# Patient Record
Sex: MALE | Race: WHITE | NOT HISPANIC OR LATINO | Employment: OTHER | URBAN - METROPOLITAN AREA
[De-identification: names, ages, dates, MRNs, and addresses within clinical notes are randomized per-mention and may not be internally consistent; named-entity substitution may affect disease eponyms.]

---

## 2018-11-22 ENCOUNTER — HOSPITAL ENCOUNTER (INPATIENT)
Facility: HOSPITAL | Age: 38
LOS: 1 days | Discharge: HOME | DRG: 638 | End: 2018-11-23
Attending: EMERGENCY MEDICINE | Admitting: INTERNAL MEDICINE
Payer: MEDICARE

## 2018-11-22 DIAGNOSIS — E11.10 DKA (DIABETIC KETOACIDOSES): Primary | ICD-10-CM

## 2018-11-22 DIAGNOSIS — N39.0 UTI (URINARY TRACT INFECTION): ICD-10-CM

## 2018-11-22 PROBLEM — E16.2 HYPOGLYCEMIA: Status: ACTIVE | Noted: 2018-11-22

## 2018-11-22 LAB
ACETONE SERPL-MCNC: ABNORMAL MG/DL
ALBUMIN SERPL BCP-MCNC: 4.1 G/DL (ref 3–5.2)
ALP SERPL-CCNC: 226 U/L (ref 43–122)
ALT SERPL W P-5'-P-CCNC: 38 U/L (ref 9–52)
AMPHETAMINES SERPL QL SCN: NEGATIVE
ANION GAP SERPL CALCULATED.3IONS-SCNC: 19 MMOL/L (ref 5–14)
ANION GAP SERPL CALCULATED.3IONS-SCNC: 28 MMOL/L (ref 5–14)
ANION GAP SERPL CALCULATED.3IONS-SCNC: 29 MMOL/L (ref 5–14)
AST SERPL W P-5'-P-CCNC: 36 U/L (ref 17–59)
BACTERIA UR QL AUTO: ABNORMAL /HPF
BARBITURATES UR QL: NEGATIVE
BASE EX.OXY STD BLDV CALC-SCNC: 60.9 %
BASE EXCESS BLDV CALC-SCNC: -18.5 MMOL/L (ref -2.1–2.1)
BASOPHILS # BLD AUTO: 0.1 THOUSANDS/ΜL (ref 0–0.1)
BASOPHILS NFR BLD AUTO: 1 % (ref 0–1)
BENZODIAZ UR QL: NEGATIVE
BILIRUB SERPL-MCNC: 0.6 MG/DL
BILIRUB UR QL STRIP: NEGATIVE
BUN SERPL-MCNC: 29 MG/DL (ref 5–25)
BUN SERPL-MCNC: 32 MG/DL (ref 5–25)
BUN SERPL-MCNC: 32 MG/DL (ref 5–25)
CALCIUM SERPL-MCNC: 8.7 MG/DL (ref 8.4–10.2)
CALCIUM SERPL-MCNC: 9.4 MG/DL (ref 8.4–10.2)
CALCIUM SERPL-MCNC: 9.6 MG/DL (ref 8.4–10.2)
CHLORIDE SERPL-SCNC: 103 MMOL/L (ref 97–108)
CHLORIDE SERPL-SCNC: 92 MMOL/L (ref 97–108)
CHLORIDE SERPL-SCNC: 92 MMOL/L (ref 97–108)
CLARITY UR: ABNORMAL
CO2 SERPL-SCNC: 14 MMOL/L (ref 22–30)
CO2 SERPL-SCNC: 8 MMOL/L (ref 22–30)
CO2 SERPL-SCNC: 9 MMOL/L (ref 22–30)
COCAINE UR QL: NEGATIVE
COLOR UR: ABNORMAL
CREAT SERPL-MCNC: 0.78 MG/DL (ref 0.7–1.5)
CREAT SERPL-MCNC: 0.89 MG/DL (ref 0.7–1.5)
CREAT SERPL-MCNC: 0.91 MG/DL (ref 0.7–1.5)
EOSINOPHIL # BLD AUTO: 0.1 THOUSAND/ΜL (ref 0–0.4)
EOSINOPHIL NFR BLD AUTO: 1 % (ref 0–6)
ERYTHROCYTE [DISTWIDTH] IN BLOOD BY AUTOMATED COUNT: 16.3 %
GFR SERPL CREATININE-BSD FRML MDRD: 108 ML/MIN/1.73SQ M
GFR SERPL CREATININE-BSD FRML MDRD: 110 ML/MIN/1.73SQ M
GFR SERPL CREATININE-BSD FRML MDRD: 116 ML/MIN/1.73SQ M
GLUCOSE SERPL-MCNC: 311 MG/DL (ref 70–99)
GLUCOSE SERPL-MCNC: 325 MG/DL (ref 70–99)
GLUCOSE SERPL-MCNC: 362 MG/DL (ref 70–99)
GLUCOSE SERPL-MCNC: 380 MG/DL (ref 70–99)
GLUCOSE SERPL-MCNC: 390 MG/DL (ref 70–99)
GLUCOSE SERPL-MCNC: 508 MG/DL (ref 70–99)
GLUCOSE SERPL-MCNC: 742 MG/DL (ref 70–99)
GLUCOSE SERPL-MCNC: 748 MG/DL (ref 70–99)
GLUCOSE SERPL-MCNC: >600 MG/DL (ref 70–99)
GLUCOSE SERPL-MCNC: >600 MG/DL (ref 70–99)
GLUCOSE UR STRIP-MCNC: ABNORMAL MG/DL
HCO3 BLDV-SCNC: 9.3 MMOL/L (ref 23–28)
HCT VFR BLD AUTO: 38.6 % (ref 41–53)
HGB BLD-MCNC: 12 G/DL (ref 13.5–17.5)
HGB UR QL STRIP.AUTO: 150
KETONES UR STRIP-MCNC: ABNORMAL MG/DL
LACTATE SERPL-SCNC: 2.1 MMOL/L (ref 0.7–2)
LACTATE SERPL-SCNC: 2.1 MMOL/L (ref 0.7–2)
LEUKOCYTE ESTERASE UR QL STRIP: 500
LYMPHOCYTES # BLD AUTO: 2.3 THOUSANDS/ΜL (ref 0.5–4)
LYMPHOCYTES NFR BLD AUTO: 20 % (ref 20–50)
MAGNESIUM SERPL-MCNC: 2.4 MG/DL (ref 1.6–2.3)
MCH RBC QN AUTO: 27.7 PG (ref 26–34)
MCHC RBC AUTO-ENTMCNC: 31.2 G/DL (ref 31–36)
MCV RBC AUTO: 89 FL (ref 80–100)
METHADONE UR QL: NEGATIVE
MONOCYTES # BLD AUTO: 0.5 THOUSAND/ΜL (ref 0.2–0.9)
MONOCYTES NFR BLD AUTO: 4 % (ref 1–10)
NEUTROPHILS # BLD AUTO: 8.2 THOUSANDS/ΜL (ref 1.8–7.8)
NEUTS SEG NFR BLD AUTO: 73 % (ref 45–65)
NITRITE UR QL STRIP: NEGATIVE
NON-SQ EPI CELLS URNS QL MICRO: ABNORMAL /HPF
O2 CT BLDV-SCNC: 10.9 ML/DL
OPIATES UR QL SCN: NEGATIVE
PCO2 BLDV: 28 MM HG (ref 41–51)
PCP UR QL: NEGATIVE
PH BLDV: 7.13 [PH] (ref 7.35–7.45)
PH UR STRIP.AUTO: 5 [PH] (ref 4.5–8)
PLATELET # BLD AUTO: 578 THOUSANDS/UL (ref 150–450)
PMV BLD AUTO: 7.7 FL (ref 8.9–12.7)
PO2 BLDV: 35 MM HG
POTASSIUM SERPL-SCNC: 5 MMOL/L (ref 3.6–5)
POTASSIUM SERPL-SCNC: 5.6 MMOL/L (ref 3.6–5)
POTASSIUM SERPL-SCNC: 5.7 MMOL/L (ref 3.6–5)
POTASSIUM SERPL-SCNC: 5.7 MMOL/L (ref 3.6–5)
PROT SERPL-MCNC: 7.9 G/DL (ref 5.9–8.4)
PROT UR STRIP-MCNC: ABNORMAL MG/DL
RBC # BLD AUTO: 4.35 MILLION/UL (ref 4.5–5.9)
RBC #/AREA URNS AUTO: ABNORMAL /HPF
SODIUM SERPL-SCNC: 129 MMOL/L (ref 137–147)
SODIUM SERPL-SCNC: 129 MMOL/L (ref 137–147)
SODIUM SERPL-SCNC: 136 MMOL/L (ref 137–147)
SP GR UR STRIP.AUTO: 1.01 (ref 1–1.04)
THC UR QL: NEGATIVE
UROBILINOGEN UA: NEGATIVE MG/DL
WBC # BLD AUTO: 11.2 THOUSAND/UL (ref 4.5–11)
WBC #/AREA URNS AUTO: ABNORMAL /HPF

## 2018-11-22 PROCEDURE — 82009 KETONE BODYS QUAL: CPT | Performed by: EMERGENCY MEDICINE

## 2018-11-22 PROCEDURE — 36415 COLL VENOUS BLD VENIPUNCTURE: CPT | Performed by: EMERGENCY MEDICINE

## 2018-11-22 PROCEDURE — 87086 URINE CULTURE/COLONY COUNT: CPT | Performed by: EMERGENCY MEDICINE

## 2018-11-22 PROCEDURE — 87147 CULTURE TYPE IMMUNOLOGIC: CPT | Performed by: EMERGENCY MEDICINE

## 2018-11-22 PROCEDURE — 99223 1ST HOSP IP/OBS HIGH 75: CPT | Performed by: INTERNAL MEDICINE

## 2018-11-22 PROCEDURE — 85025 COMPLETE CBC W/AUTO DIFF WBC: CPT | Performed by: EMERGENCY MEDICINE

## 2018-11-22 PROCEDURE — 82805 BLOOD GASES W/O2 SATURATION: CPT | Performed by: EMERGENCY MEDICINE

## 2018-11-22 PROCEDURE — 81001 URINALYSIS AUTO W/SCOPE: CPT | Performed by: EMERGENCY MEDICINE

## 2018-11-22 PROCEDURE — 83605 ASSAY OF LACTIC ACID: CPT | Performed by: EMERGENCY MEDICINE

## 2018-11-22 PROCEDURE — 96361 HYDRATE IV INFUSION ADD-ON: CPT

## 2018-11-22 PROCEDURE — 93005 ELECTROCARDIOGRAM TRACING: CPT

## 2018-11-22 PROCEDURE — 87186 SC STD MICRODIL/AGAR DIL: CPT | Performed by: EMERGENCY MEDICINE

## 2018-11-22 PROCEDURE — 87040 BLOOD CULTURE FOR BACTERIA: CPT | Performed by: EMERGENCY MEDICINE

## 2018-11-22 PROCEDURE — 96374 THER/PROPH/DIAG INJ IV PUSH: CPT

## 2018-11-22 PROCEDURE — 82948 REAGENT STRIP/BLOOD GLUCOSE: CPT

## 2018-11-22 PROCEDURE — 80048 BASIC METABOLIC PNL TOTAL CA: CPT | Performed by: EMERGENCY MEDICINE

## 2018-11-22 PROCEDURE — 84132 ASSAY OF SERUM POTASSIUM: CPT | Performed by: EMERGENCY MEDICINE

## 2018-11-22 PROCEDURE — 80053 COMPREHEN METABOLIC PANEL: CPT | Performed by: EMERGENCY MEDICINE

## 2018-11-22 PROCEDURE — 83735 ASSAY OF MAGNESIUM: CPT | Performed by: EMERGENCY MEDICINE

## 2018-11-22 PROCEDURE — 99285 EMERGENCY DEPT VISIT HI MDM: CPT

## 2018-11-22 PROCEDURE — 80307 DRUG TEST PRSMV CHEM ANLYZR: CPT | Performed by: EMERGENCY MEDICINE

## 2018-11-22 RX ORDER — DEXTROSE AND SODIUM CHLORIDE 5; .9 G/100ML; G/100ML
250 INJECTION, SOLUTION INTRAVENOUS CONTINUOUS
Status: DISCONTINUED | OUTPATIENT
Start: 2018-11-22 | End: 2018-11-23

## 2018-11-22 RX ORDER — NICOTINE 21 MG/24HR
1 PATCH, TRANSDERMAL 24 HOURS TRANSDERMAL DAILY
Status: DISCONTINUED | OUTPATIENT
Start: 2018-11-23 | End: 2018-11-23 | Stop reason: HOSPADM

## 2018-11-22 RX ORDER — ACETAMINOPHEN 325 MG/1
650 TABLET ORAL EVERY 6 HOURS PRN
Status: DISCONTINUED | OUTPATIENT
Start: 2018-11-22 | End: 2018-11-23 | Stop reason: HOSPADM

## 2018-11-22 RX ORDER — SODIUM CHLORIDE 9 MG/ML
1000 INJECTION, SOLUTION INTRAVENOUS ONCE
Status: COMPLETED | OUTPATIENT
Start: 2018-11-22 | End: 2018-11-22

## 2018-11-22 RX ORDER — LEVOFLOXACIN 5 MG/ML
750 INJECTION, SOLUTION INTRAVENOUS ONCE
Status: COMPLETED | OUTPATIENT
Start: 2018-11-22 | End: 2018-11-22

## 2018-11-22 RX ORDER — CIPROFLOXACIN 250 MG/1
500 TABLET, FILM COATED ORAL EVERY 12 HOURS SCHEDULED
Status: DISCONTINUED | OUTPATIENT
Start: 2018-11-23 | End: 2018-11-23 | Stop reason: HOSPADM

## 2018-11-22 RX ORDER — SODIUM CHLORIDE 9 MG/ML
500 INJECTION, SOLUTION INTRAVENOUS CONTINUOUS
Status: DISPENSED | OUTPATIENT
Start: 2018-11-22 | End: 2018-11-22

## 2018-11-22 RX ORDER — CIPROFLOXACIN 2 MG/ML
400 INJECTION, SOLUTION INTRAVENOUS EVERY 12 HOURS
Status: DISCONTINUED | OUTPATIENT
Start: 2018-11-23 | End: 2018-11-22

## 2018-11-22 RX ORDER — SODIUM CHLORIDE 9 MG/ML
500 INJECTION, SOLUTION INTRAVENOUS CONTINUOUS
Status: DISCONTINUED | OUTPATIENT
Start: 2018-11-22 | End: 2018-11-22

## 2018-11-22 RX ORDER — SODIUM CHLORIDE 9 MG/ML
2000 INJECTION, SOLUTION INTRAVENOUS CONTINUOUS
Status: DISPENSED | OUTPATIENT
Start: 2018-11-22 | End: 2018-11-22

## 2018-11-22 RX ORDER — 0.9 % SODIUM CHLORIDE 0.9 %
3 VIAL (ML) INJECTION AS NEEDED
Status: DISCONTINUED | OUTPATIENT
Start: 2018-11-22 | End: 2018-11-23 | Stop reason: HOSPADM

## 2018-11-22 RX ORDER — SODIUM CHLORIDE 9 MG/ML
250 INJECTION, SOLUTION INTRAVENOUS CONTINUOUS
Status: DISPENSED | OUTPATIENT
Start: 2018-11-22 | End: 2018-11-23

## 2018-11-22 RX ORDER — SODIUM CHLORIDE 9 MG/ML
250 INJECTION, SOLUTION INTRAVENOUS CONTINUOUS
Status: DISCONTINUED | OUTPATIENT
Start: 2018-11-22 | End: 2018-11-22

## 2018-11-22 RX ADMIN — Medication 6 UNITS/HR: at 17:20

## 2018-11-22 RX ADMIN — SODIUM CHLORIDE 1000 ML/HR: 9 INJECTION, SOLUTION INTRAVENOUS at 17:13

## 2018-11-22 RX ADMIN — SODIUM CHLORIDE 250 ML/HR: 9 INJECTION, SOLUTION INTRAVENOUS at 20:23

## 2018-11-22 RX ADMIN — SODIUM CHLORIDE 250 ML/HR: 9 INJECTION, SOLUTION INTRAVENOUS at 20:21

## 2018-11-22 RX ADMIN — INSULIN HUMAN 6 UNITS: 100 INJECTION, SOLUTION PARENTERAL at 17:14

## 2018-11-22 RX ADMIN — LEVOFLOXACIN 750 MG: 750 INJECTION, SOLUTION INTRAVENOUS at 17:02

## 2018-11-22 RX ADMIN — SODIUM CHLORIDE 250 ML/HR: 9 INJECTION, SOLUTION INTRAVENOUS at 23:54

## 2018-11-22 RX ADMIN — SODIUM CHLORIDE 1000 ML: 9 INJECTION, SOLUTION INTRAVENOUS at 15:50

## 2018-11-22 RX ADMIN — SODIUM CHLORIDE 500 ML/HR: 9 INJECTION, SOLUTION INTRAVENOUS at 18:06

## 2018-11-22 NOTE — H&P
History and Physical - [age] [facility] Internal Medicine    Patient Information: Mariel Torres 41 y o  male MRN: [id number]  Unit/Bed#: ED 03 Encounter: [id number]  Admitting Physician: Keagan Coker  PCP: No primary care provider on file  Date of Admission:  11/22/18    Assessment/Plan:    Hospital Problem List:     Principal Problem:    DKA (diabetic ketoacidoses) ([address] )  Active Problems:    UTI (urinary tract infection)      Plan for the Primary Problem(s):      #1diabetic ketoacidosis  Initiate DKA protocol  IV fluids with normal saline per DKA protocol  Electrolyte replacement per DKA protocol   Admit to ICU/every 4 BMPs  Begin no concentrated carbohydrate diet  Once anion gap closed is following  Cover with Aspart SSI as needed   Will order HgbA1C to assess status of recent glycemic control  Well initiate home medications once med rec is completed and confirmed by pharmacy  #2 UTI  Levofloxacin initiated in the emergency department  We will switch to by mouth or IV Cipro tomorrow    #3 hyponatremia  Likely due to dehydration from DKA  Aggressive IV resuscitation with normal saline    #4Tobacco dependance  SMOKING history/NICOTINE ADDICTION:  20 pack year history  [facility] is a non-smoking campus and patient likely to have cravings  Will initiate therapy with transdermal Nicotine patch 14 mg transdermal daily  Extensive consultation in regards to benefits of cessation have been given, offered help the sensation  #5 hyperkalemia? This is likely due to hemolysis  We will repeat BMP again and address accordingly    VTE Prophylaxis: Enoxaparin (Lovenox)  / sequential compression device   Code Status: code  POLST: There is no POLST form on file for this patient (pre-hospital)    Anticipated Length of Stay:  Patient will be admitted on an Inpatient basis with an anticipated length of stay of  Less than2 midnights     Justification for Hospital Stay: DKA    Total Time for Visit, including Counseling / Coordination of Care: 45 minutes  Greater than 50% of this total time spent on direct patient counseling and coordination of care  Chief Complaint:   Elevated sugars ×2 days  History of Present Illness:    Preethi Meneses is a 41 y o  male who was sent here due to elevated blood glucose levels  Patient reports unfortunately due to homelessness he has not been able to take his medications as previously ordered, endorses being 2 5 days without insulin  States he is very thirsty  Feels weak  + polyuria and polydipsia  States he was recently admitted in [place], [place]  Came here for housing reasons  States he feels his glucose is high  No fevers or chills symptoms moderate in severity  No other aggravating or alleviating factors  No chest pain  No cough  No uri symptoms  No radiation of symptoms  He was found to have DKA and will be admitted for further evaluation and treatment  He has no present complaints      Review of Systems:    Review of Systems  12 point system reviewed are negative except as above  Past Medical and Surgical History:     Past Medical History:   Diagnosis Date    Diabetes mellitus ([facility] [address] )     Hepatitis C     Orthostatic hypotension        Past Surgical History:   Procedure Laterality Date    CYST REMOVAL      FRACTURE SURGERY         Meds/Allergies:    Prior to Admission medications    Not on File     I have reviewed home medications with patient personally  Allergies:    Allergies   Allergen Reactions    Ceclor [Cefaclor] Rash    Fish Oil Rash    Shellfish-Derived Products Rash       Social History:     Marital Status: Single   Occupation: unemployed  Patient Pre-hospital Living Situation: currently homeless/face [place]  Patient Pre-hospital Level of Mobility: full mobility  Patient Pre-hospital Diet Restrictions: does not follow diabetic diet  Substance Use History:   History   Alcohol Use No     History   Smoking Status    Current Some Day Smoker   Smokeless Tobacco  Not on file     History   Drug Use No     Comment: hx meth use - clean x2 weeks       Family History:    non-contributory    Physical Exam:     Vitals:   Blood Pressure: 146/73 (11/22/18 1703)  Pulse: 98 (11/22/18 1703)  Temperature: (!) 96 2 °F (35 7 °C) (11/22/18 1522)  Temp Source: Tympanic (11/22/18 1522)  Respirations: (!) 24 (11/22/18 1703)  Weight - Scale: 63 5 kg (140 lb) (11/22/18 1522)  SpO2: 100 % (11/22/18 1703)    Physical Exam  The patient appeared well nourished and normally developed  Vital signs as documented  Head exam is unremarkable  No scleral icterus or corneal arcus noted  Neck is without jugular venous distension, thyromegaly, or carotid bruits  Carotid upstrokes are brisk bilaterally  Lungs are clear to auscultation and percussion  Cardiac exam reveals the PMI to be normally sized and situated  Rhythm is regular  First and second heart sounds normal  No murmurs, rubs or gallops  Abdominal exam reveals normal bowel sounds, no masses, no organomegaly and no aortic enlargement  Extremities are nonedematous and both femoral and pedal pulses are normal   Additional Data:     Lab Results: I have personally reviewed pertinent reports  Results from last 7 days  Lab Units 11/22/18  1546   WBC Thousand/uL 11 20*   HEMOGLOBIN g/dL 12 0*   HEMATOCRIT % 38 6*   PLATELETS Thousands/uL 578*   NEUTROS PCT % 73*   LYMPHS PCT % 20   MONOS PCT % 4   EOS PCT % 1       Results from last 7 days  Lab Units 11/22/18  1546   POTASSIUM mmol/L 5 7*   CHLORIDE mmol/L 92*   CO2 mmol/L 8*   BUN mg/dL 32*   CREATININE mg/dL 0 89   CALCIUM mg/dL 9 6   ALK PHOS U/L 226*   ALT U/L 38   AST U/L 36           Imaging: I have personally reviewed pertinent reports  No results found  EKG, Pathology, and Other Studies Reviewed on Admission:   · EKG: noncontributory    Allscripts / Epic Records Reviewed: No     ** Please Note: This note has been constructed using a voice recognition system   **

## 2018-11-22 NOTE — SEPSIS NOTE
Sepsis Note   Lopez Steinberg 41 y o  male MRN: [id number]  Unit/Bed#: ED 03 Encounter: [id number]            Initial Sepsis Screening     Row Name 11/22/18 1708 11/22/18 1636             Is the patient's history suggestive of a new or worsening infection? (!)  Yes (Proceed)  -RP (!)  Yes (Proceed)  -RP       Suspected source of infection urinary tract infection  -RP urinary tract infection  -RP       Are two or more of the following signs & symptoms of infection both present and new to the patient?  (!)  Yes (Proceed)  -RP       Indicate SIRS criteria Tachycardia > 90 bpm;Tachypnea > 20 resp per min  -RP Tachycardia > 90 bpm;Tachypnea > 20 resp per min  -RP       If the answer is yes to both questions, suspicion of sepsis is present  [de-identified]         If severe sepsis is present AND tissue hypoperfusion perists in the hour after fluid resuscitation or lactate > 4, the patient meets criteria for SEPTIC SHOCK           Are any of the following organ dysfunction criteria present within 6 hours of suspected infection and SIRS criteria that are NOT considered to be chronic conditions?  (!)  Yes  -RP       Organ dysfunction Lactate > 2 0 mmol/L  -RP         Date of presentation of severe sepsis 11/22/18  -RP         Time of presentation of severe sepsis 1709  -RP         Tissue hypoperfusion persists in the hour after crystalloid fluid administration, evidenced, by either:           Was hypotension present within one hour of the conclusion of crystalloid fluid administration?  No  -RP         Date of presentation of septic shock           Time of presentation of septic shock             User Key  (r) = Recorded By, (t) = Taken By, (c) = Cosigned By    [address] Name Provider Type    RP Rashard Kelly MD Physician               [facility] (last 720 hours)      Sepsis Reassess     [address] Name 11/22/18 1740                   Repeat Volume Status and Tissue Perfusion Assessment Performed    Repeat Volume Status and Tissue Perfusion Assessment Performed Yes  -RP           Volume Status and Tissue Perfusion Post Fluid Resuscitation- Must Document 5 of the Following 7:    Vital Signs Reviewed (HR, RR, BP, T) Yes  -RP        Arterial Oxygen Saturation Reviewed (POx, SaO2 or SpO2) Yes (comment %)  -RP        Cardio (!)  Normal S1/S2; Tachycardia;Regular rate and rhythm  -RP        Pulmonary Normal effort;Clear to auscultation  -RP        Capillary Refill Brisk  -RP        Peripheral Pulses Radial  -RP        Peripheral Pulse +2  -RP        Skin Warm  -RP        Urine output assessed Adequate  -RP           *OR*   Intensive Monitoring- Must Document One of the Following Four *:    Vital Signs Reviewed          * Central Venous Pressure (CVP or RAP)          * Central Venous Oxygen (SVO2, ScvO2 or Oxygen saturation via central catheter)          * Bedside Cardiovascular US in IVC diameter and % collapse          * Passive Leg Raise OR Crystalloid Challenge            User Key  (r) = Recorded By, (t) = Taken By, (c) = Cosigned By    Initials Name Provider Type    RP Leila Ellington MD Physician

## 2018-11-22 NOTE — ED PROVIDER NOTES
History  Chief Complaint   Patient presents with    Hyperglycemia - Symptomatic     Patient has not had insulin for 2 days, accucheck for EMS reading HI     This is a 40 y/o male that presents to the ED after 2 5 days without insulin  States he is very thirsty  Feels weak  + polyuria and polydipsia  States he was recently admitted in [place], [facility]  Came here for housing reasons  States he feels his glucose is high  No fevers or chills symptoms moderate in severity  No other aggravating or alleviating factors  No chest pain  No cough  No uri symptoms  No radiation of symptoms  DDx: hyperglycemia, DKA  None       Past Medical History:   Diagnosis Date    Diabetes mellitus ([facility] [address] )     Hepatitis C     Orthostatic hypotension        Past Surgical History:   Procedure Laterality Date    CYST REMOVAL      FRACTURE SURGERY         History reviewed  No pertinent family history  I have reviewed and agree with the history as documented  Social History   Substance Use Topics    Smoking status: Current Some Day Smoker    Smokeless tobacco: Not on file    Alcohol use No        Review of Systems   Constitutional: Negative for activity change, appetite change and fever  HENT: Negative for congestion, ear pain, rhinorrhea and sore throat  Eyes: Negative for pain, discharge and redness  Respiratory: Negative for cough, shortness of breath and wheezing  Cardiovascular: Negative for chest pain and palpitations  Gastrointestinal: Negative for abdominal pain, diarrhea, nausea and vomiting  Endocrine: Positive for polydipsia and polyuria  Genitourinary: Negative for difficulty urinating, dysuria, frequency and urgency  Musculoskeletal: Negative for arthralgias and myalgias  Skin: Negative for color change and rash  Allergic/Immunologic: Negative for immunocompromised state  Neurological: Positive for weakness  Negative for dizziness, syncope and light-headedness     Hematological: Does not bruise/bleed easily  Psychiatric/Behavioral: Negative for confusion  All other systems reviewed and are negative  Physical Exam  Physical Exam   Constitutional: He is oriented to person, place, and time  He appears well-developed  No distress  HENT:   Head: Normocephalic and atraumatic  Nose: Nose normal    Eyes: Conjunctivae are normal  No scleral icterus  Neck: Normal range of motion  Neck supple  Cardiovascular: Regular rhythm, normal heart sounds and intact distal pulses  Tachycardic  Pulmonary/Chest: Effort normal and breath sounds normal  No stridor  No respiratory distress  He has no wheezes  Tachypnea  Abdominal: Soft  He exhibits no distension  There is no tenderness  There is no rebound and no guarding  Musculoskeletal: He exhibits no edema or deformity  Neurological: He is alert and oriented to person, place, and time  No cranial nerve deficit  He exhibits normal muscle tone  Coordination normal    Skin: Skin is warm and dry  No rash noted  Psychiatric: He has a normal mood and affect  Thought content normal    Nursing note and vitals reviewed        Vital Signs  ED Triage Vitals [11/22/18 1522]   Temperature Pulse Respirations Blood Pressure SpO2   (!) 96 2 °F (35 7 °C) 94 (!) 26 150/76 100 %      Temp Source Heart Rate Source Patient Position - Orthostatic VS BP Location FiO2 (%)   Tympanic Monitor Sitting Left arm --      Pain Score       --           Vitals:    11/22/18 1522 11/22/18 1703 11/22/18 1715   BP: 150/76 146/73 155/84   Pulse: 94 98 104   Patient Position - Orthostatic VS: Sitting Lying Lying       Visual Acuity      ED Medications  Medications   levofloxacin (LEVAQUIN) IVPB (premix) 750 mg (750 mg Intravenous New Bag 11/22/18 1702)   sodium chloride (PF) 0 9 % injection 3 mL (not administered)   sodium chloride 0 9 % infusion (not administered)     Followed by   sodium chloride 0 9 % infusion (not administered)     Followed by   sodium chloride 0 9 % infusion (not administered)   dextrose 5 % and sodium chloride 0 9 % infusion (not administered)   nicotine (NICODERM CQ) 14 mg/24hr TD 24 hr patch 1 patch (not administered)   enoxaparin (LOVENOX) subcutaneous injection 40 mg (not administered)   insulin regular (HumuLIN R,NovoLIN R) 1 Units/mL in sodium chloride 0 9 % 100 mL infusion (6 Units/hr Intravenous New Bag 11/22/18 1720)   acetaminophen (TYLENOL) tablet 650 mg (not administered)   ciprofloxacin (CIPRO) tablet 500 mg (not administered)   sodium chloride 0 9 % bolus 1,000 mL (0 mL Intravenous Stopped 11/22/18 1652)   sodium chloride 0 9 % infusion (1,000 mL/hr Intravenous New Bag 11/22/18 1713)   insulin regular (HumuLIN R,NovoLIN R) injection 6 Units (6 Units Intravenous Given 11/22/18 1714)       Diagnostic Studies  Results Reviewed     Procedure Component Value Units Date/Time    Fingerstick Glucose (POCT) [[id number]]  (Abnormal) Collected:  11/22/18 1714    Lab Status:  Final result Updated:  11/22/18 1731     POC Glucose >600 (HH) mg/dl     Urine culture [[id number]]     Lab Status:  No result Specimen:  Urine from Urine, Clean Catch     Platelet count [[id number]]     Lab Status:  No result Specimen:  Blood     Potassium [[id number]]  (Abnormal) Collected:  11/22/18 1705    Lab Status:  Final result Specimen:  Blood from Arm, Left Updated:  11/22/18 1723     Potassium 5 6 (H) mmol/L     Blood culture - Set 2 [[id number]] Collected:  11/22/18 1644    Lab Status: In process Specimen:  Blood from Arm, Left Updated:  11/22/18 1713    Blood culture - Set 1 [[id number]] Collected:  11/22/18 1644    Lab Status:   In process Specimen:  Blood from Arm, Right Updated:  11/22/18 3778    Basic metabolic panel [[id number]]     Lab Status:  No result Specimen:  Blood     Lactic acid x2 Q2h [[id number]]  (Abnormal) Collected:  11/22/18 1646    Lab Status:  Final result Specimen:  Blood from Arm, Right Updated:  11/22/18 1707     LACTIC ACID 2 1 (HH) mmol/L     Narrative: Result may be elevated if tourniquet was used during collection  Comprehensive metabolic panel [[id number]]  (Abnormal) Collected:  11/22/18 1546    Lab Status:  Final result Specimen:  Blood from Arm, Right Updated:  11/22/18 1636     Sodium 129 (L) mmol/L      Potassium 5 7 (H) mmol/L      Chloride 92 (L) mmol/L      CO2 8 (LL) mmol/L      ANION GAP 29 (H) mmol/L      BUN 32 (H) mg/dL      Creatinine 0 89 mg/dL      Glucose 748 (HH) mg/dL      Calcium 9 6 mg/dL      AST 36 U/L      ALT 38 U/L      Alkaline Phosphatase 226 (H) U/L      Total Protein 7 9 g/dL      Albumin 4 1 g/dL      Total Bilirubin 0 60 mg/dL      eGFR 110 ml/min/1 73sq m     Narrative:       Hemolysis  National Kidney Disease Education Program recommendations are as follows:  GFR calculation is accurate only with a steady state creatinine  Chronic Kidney disease less than 60 ml/min/1 73 sq  meters  Kidney failure less than 15 ml/min/1 73 sq  meters  Rapid drug screen, urine [[id number]]  (Normal) Collected:  11/22/18 1550    Lab Status:  Final result Specimen:  Urine from Urine, Clean Catch Updated:  11/22/18 1624     Amph/Meth UR Negative     Barbiturate Ur Negative     Benzodiazepine Urine Negative     Cocaine Urine Negative     Methadone Urine Negative     Opiate Urine Negative     PCP Ur Negative     THC Urine Negative    Narrative:         FOR MEDICAL PURPOSES ONLY  IF CONFIRMATION NEEDED PLEASE CONTACT THE LAB WITHIN 5 DAYS      Drug Screen Cutoff Levels:  AMPHETAMINE/METHAMPHETAMINES  1000 ng/mL  BARBITURATES     200 ng/mL  BENZODIAZEPINES     200 ng/mL  COCAINE      300 ng/mL  METHADONE      300 ng/mL  OPIATES      300 ng/mL  PHENCYCLIDINE     25 ng/mL  THC       50 ng/mL    Lactic acid x2 Q2h [[id number]]     Lab Status:  No result Specimen:  Blood     Urine Microscopic [[id number]]  (Abnormal) Collected:  11/22/18 1550    Lab Status:  Final result Specimen:  Urine from Urine, Clean Catch Updated:  11/22/18 1616     RBC, UA Innumerable (A) /hpf      WBC, UA Innumerable (A) /hpf      Epithelial Cells None Seen /hpf      Bacteria, UA Moderate (A) /hpf     Magnesium [[id number]]  (Abnormal) Collected:  11/22/18 1546    Lab Status:  Final result Specimen:  Blood from Arm, Right Updated:  11/22/18 1614     Magnesium 2 4 (H) mg/dL     Narrative:       Hemolysis    Acetone [[id number]]  (Abnormal) Collected:  11/22/18 1546    Lab Status:  Final result Specimen:  Blood from Arm, Right Updated:  11/22/18 1608     Acetone, Bld 4+ (A)    UA w Reflex to Microscopic [[id number]]  (Abnormal) Collected:  11/22/18 1550    Lab Status:  Final result Specimen:  Urine from Urine, Clean Catch Updated:  11/22/18 1606     Color, UA Straw     Clarity, UA Cloudy (A)     Specific Gravity, UA 1 015     pH, UA 5 0     Leukocytes,  0 (A)     Nitrite, UA Negative     Protein,  (2+) (A) mg/dl      Glucose, UA >=1000 (1%) (A) mg/dl      Ketones, UA >=150 (3+) (A) mg/dl      Bilirubin, UA Negative     Blood,  0 (A)     UROBILINOGEN UA Negative mg/dL     Blood gas, venous [[id number]]  (Abnormal) Collected:  11/22/18 1546    Lab Status:  Final result Specimen:  Blood from Arm, Right Updated:  11/22/18 1603     pH, Truong 7 130 (L)     pCO2, Truong 28 0 (L) mm Hg      pO2, Truong 35 0 (L) mm Hg      HCO3, Truong 9 3 (L) mmol/L      Base Excess, Truong -18 5 (L) mmol/L      O2 Content, Truong 10 9 ml/dL      O2 HGB, VENOUS 60 9 (L) %     CBC and differential [[id number]]  (Abnormal) Collected:  11/22/18 1546    Lab Status:  Final result Specimen:  Blood from Arm, Right Updated:  11/22/18 1600     WBC 11 20 (H) Thousand/uL      RBC 4 35 (L) Million/uL      Hemoglobin 12 0 (L) g/dL      Hematocrit 38 6 (L) %      MCV 89 fL      MCH 27 7 pg      MCHC 31 2 g/dL      RDW 16 3 (H) %      MPV 7 7 (L) fL      Platelets 050 (H) Thousands/uL      Neutrophils Relative 73 (H) %      Lymphocytes Relative 20 %      Monocytes Relative 4 %      Eosinophils Relative 1 %      Basophils Relative 1 %      Neutrophils Absolute 8 20 (H) Thousands/µL      Lymphocytes Absolute 2 30 Thousands/µL      Monocytes Absolute 0 50 Thousand/µL      Eosinophils Absolute 0 10 Thousand/µL      Basophils Absolute 0 10 Thousands/µL     Fingerstick Glucose (POCT) [[id number]]  (Abnormal) Collected:  11/22/18 1533    Lab Status:  Final result Updated:  11/22/18 1544     POC Glucose >600 (HH) mg/dl                  No orders to display              Procedures  ECG 12 Lead Documentation  Date/Time: 11/22/2018 5:17 PM  Performed by: Lina Arroyo  Authorized by: Lina Arroyo     Indications / Diagnosis:  Tachycardia  ECG reviewed by me, the ED Provider: yes    Patient location:  ED  Rate:     ECG rate assessment: tachycardic    Rhythm:     Rhythm: sinus rhythm    Ectopy:     Ectopy: none    QRS:     QRS axis:  Normal    QRS intervals:  Normal  Conduction:     Conduction: normal    ST segments:     ST segments:  Normal  T waves:     T waves: normal    Other findings:     Other findings: prolonged qTc interval    CriticalCare Time  Performed by: Latonya Miller by: Lina Arroyo     Critical care provider statement:     Critical care time (minutes):  40    Critical care time was exclusive of:  Separately billable procedures and treating other patients and teaching time    Critical care was necessary to treat or prevent imminent or life-threatening deterioration of the following conditions:  Sepsis and endocrine crisis    Critical care was time spent personally by me on the following activities:  Obtaining history from patient or surrogate, development of treatment plan with patient or surrogate, discussions with consultants, examination of patient, evaluation of patient's response to treatment, interpretation of cardiac output measurements, ordering and performing treatments and interventions, ordering and review of laboratory studies, ordering and review of radiographic studies, re-evaluation of patient's condition and blood draw for specimens           Phone Contacts  ED Phone Contact    ED Course                         Initial Sepsis Screening     Row Name 11/22/18 1708 11/22/18 1636             Is the patient's history suggestive of a new or worsening infection? (!)  Yes (Proceed)  -RP (!)  Yes (Proceed)  -RP       Suspected source of infection urinary tract infection  -RP urinary tract infection  -RP       Are two or more of the following signs & symptoms of infection both present and new to the patient?  (!)  Yes (Proceed)  -RP       Indicate SIRS criteria Tachycardia > 90 bpm;Tachypnea > 20 resp per min  -RP Tachycardia > 90 bpm;Tachypnea > 20 resp per min  -RP       If the answer is yes to both questions, suspicion of sepsis is present  [de-identified]         If severe sepsis is present AND tissue hypoperfusion perists in the hour after fluid resuscitation or lactate > 4, the patient meets criteria for SEPTIC SHOCK           Are any of the following organ dysfunction criteria present within 6 hours of suspected infection and SIRS criteria that are NOT considered to be chronic conditions?  (!)  Yes  -RP       Organ dysfunction Lactate > 2 0 mmol/L  -RP         Date of presentation of severe sepsis 11/22/18  -RP         Time of presentation of severe sepsis 1709  -RP         Tissue hypoperfusion persists in the hour after crystalloid fluid administration, evidenced, by either:           Was hypotension present within one hour of the conclusion of crystalloid fluid administration?  No  -RP         Date of presentation of septic shock           Time of presentation of septic shock             User Key  (r) = Recorded By, (t) = Taken By, (c) = Cosigned By    [address] Name Provider Type    RP Abdi Nelson MD Physician           [facility] (last 720 hours)      Sepsis Reassess     [address] Name 11/22/18 1740                   Repeat Volume Status and Tissue Perfusion Assessment Performed Repeat Volume Status and Tissue Perfusion Assessment Performed Yes  -RP           Volume Status and Tissue Perfusion Post Fluid Resuscitation- Must Document 5 of the Following 7:    Vital Signs Reviewed (HR, RR, BP, T) Yes  -RP        Arterial Oxygen Saturation Reviewed (POx, SaO2 or SpO2) Yes (comment %)  -RP        Cardio (!)  Normal S1/S2; Tachycardia;Regular rate and rhythm  -RP        Pulmonary Normal effort;Clear to auscultation  -RP        Capillary Refill Brisk  -RP        Peripheral Pulses Radial  -RP        Peripheral Pulse +2  -RP        Skin Warm  -RP        Urine output assessed Adequate  -RP           *OR*   Intensive Monitoring- Must Document One of the Following Four *:    Vital Signs Reviewed          * Central Venous Pressure (CVP or RAP)          * Central Venous Oxygen (SVO2, ScvO2 or Oxygen saturation via central catheter)          * Bedside Cardiovascular US in IVC diameter and % collapse          * Passive Leg Raise OR Crystalloid Challenge            User Key  (r) = Recorded By, (t) = Taken By, (c) = Cosigned By    Initials Name Provider Type    RP Jun Mendiola MD Physician                MDM  Number of Diagnoses or Management Options  DKA (diabetic ketoacidoses) [facility]):   UTI (urinary tract infection):   Diagnosis management comments: Patient with DKA as well as lactic acidosis and UTI  Requires admission  Patient required frequent rechecks, aggressive fluid management, management of infectious process  Left arm 20 gauge IV placed by me under US guidance         Amount and/or Complexity of Data Reviewed  Clinical lab tests: ordered and reviewed  Discuss the patient with other providers: yes  Independent visualization of images, tracings, or specimens: yes      CritCare Time    Disposition  Final diagnoses:   DKA (diabetic ketoacidoses) ([facility] [address] )   UTI (urinary tract infection)     Time reflects when diagnosis was documented in both MDM as applicable and the Disposition within this note     Time User Action Codes Description Comment    11/22/2018  5:03 PM Henrry Mt Add [E13 10] DKA (diabetic ketoacidoses) ([facility] [address] )     11/22/2018  5:03 PM Shin AGUAYO Add [N39 0] UTI (urinary tract infection)       ED Disposition     ED Disposition Condition Comment    Admit  Case was discussed with Dr Lori Toro and the patient's admission status was agreed to be Admission Status: inpatient status to the service of Dr Lori Toro   Follow-up Information    None         Patient's Medications    No medications on file     No discharge procedures on file      ED Provider  Electronically Signed by           Nargis Ventura MD  11/22/18 [address] Julia Cavanaugh MD  11/22/18 4736

## 2018-11-22 NOTE — SEPSIS NOTE
Sepsis Note   El Perez 41 y o  male MRN: [id number]  Unit/Bed#: ED 03 Encounter: [id number]            Initial Sepsis Screening     Row Name 11/22/18 1708 11/22/18 1636             Is the patient's history suggestive of a new or worsening infection? (!)  Yes (Proceed)  -RP (!)  Yes (Proceed)  -RP       Suspected source of infection urinary tract infection  -RP urinary tract infection  -RP       Are two or more of the following signs & symptoms of infection both present and new to the patient?  (!)  Yes (Proceed)  -RP       Indicate SIRS criteria Tachycardia > 90 bpm;Tachypnea > 20 resp per min  -RP Tachycardia > 90 bpm;Tachypnea > 20 resp per min  -RP       If the answer is yes to both questions, suspicion of sepsis is present  [de-identified]         If severe sepsis is present AND tissue hypoperfusion perists in the hour after fluid resuscitation or lactate > 4, the patient meets criteria for SEPTIC SHOCK           Are any of the following organ dysfunction criteria present within 6 hours of suspected infection and SIRS criteria that are NOT considered to be chronic conditions?  (!)  Yes  -RP       Organ dysfunction Lactate > 2 0 mmol/L  -RP         Date of presentation of severe sepsis 11/22/18  -RP         Time of presentation of severe sepsis 1709  -RP         Tissue hypoperfusion persists in the hour after crystalloid fluid administration, evidenced, by either:           Was hypotension present within one hour of the conclusion of crystalloid fluid administration?  No  -RP         Date of presentation of septic shock           Time of presentation of septic shock             User Key  (r) = Recorded By, (t) = Taken By, (c) = Cosigned By    [address] Name Provider Type    RP Blayne Moon MD Physician

## 2018-11-23 VITALS
TEMPERATURE: 98.3 F | HEIGHT: 71 IN | HEART RATE: 93 BPM | RESPIRATION RATE: 13 BRPM | DIASTOLIC BLOOD PRESSURE: 73 MMHG | WEIGHT: 146.83 LBS | SYSTOLIC BLOOD PRESSURE: 118 MMHG | BODY MASS INDEX: 20.56 KG/M2 | OXYGEN SATURATION: 99 %

## 2018-11-23 LAB
ALBUMIN SERPL BCP-MCNC: 2.8 G/DL (ref 3–5.2)
ALP SERPL-CCNC: 136 U/L (ref 43–122)
ALT SERPL W P-5'-P-CCNC: 33 U/L (ref 9–52)
ANION GAP SERPL CALCULATED.3IONS-SCNC: 5 MMOL/L (ref 5–14)
ANION GAP SERPL CALCULATED.3IONS-SCNC: 6 MMOL/L (ref 5–14)
ANION GAP SERPL CALCULATED.3IONS-SCNC: 7 MMOL/L (ref 5–14)
AST SERPL W P-5'-P-CCNC: 22 U/L (ref 17–59)
BILIRUB SERPL-MCNC: 0.3 MG/DL
BUN SERPL-MCNC: 21 MG/DL (ref 5–25)
BUN SERPL-MCNC: 25 MG/DL (ref 5–25)
BUN SERPL-MCNC: 26 MG/DL (ref 5–25)
CALCIUM SERPL-MCNC: 7.8 MG/DL (ref 8.4–10.2)
CALCIUM SERPL-MCNC: 7.9 MG/DL (ref 8.4–10.2)
CALCIUM SERPL-MCNC: 8 MG/DL (ref 8.4–10.2)
CHLORIDE SERPL-SCNC: 104 MMOL/L (ref 97–108)
CHLORIDE SERPL-SCNC: 106 MMOL/L (ref 97–108)
CHLORIDE SERPL-SCNC: 107 MMOL/L (ref 97–108)
CO2 SERPL-SCNC: 21 MMOL/L (ref 22–30)
CO2 SERPL-SCNC: 23 MMOL/L (ref 22–30)
CO2 SERPL-SCNC: 23 MMOL/L (ref 22–30)
CREAT SERPL-MCNC: 0.72 MG/DL (ref 0.7–1.5)
CREAT SERPL-MCNC: 0.72 MG/DL (ref 0.7–1.5)
CREAT SERPL-MCNC: 0.92 MG/DL (ref 0.7–1.5)
ERYTHROCYTE [DISTWIDTH] IN BLOOD BY AUTOMATED COUNT: 15.9 %
EST. AVERAGE GLUCOSE BLD GHB EST-MCNC: 309 MG/DL
GFR SERPL CREATININE-BSD FRML MDRD: 107 ML/MIN/1.73SQ M
GFR SERPL CREATININE-BSD FRML MDRD: 120 ML/MIN/1.73SQ M
GFR SERPL CREATININE-BSD FRML MDRD: 120 ML/MIN/1.73SQ M
GLUCOSE SERPL-MCNC: 146 MG/DL (ref 70–99)
GLUCOSE SERPL-MCNC: 161 MG/DL (ref 70–99)
GLUCOSE SERPL-MCNC: 175 MG/DL (ref 70–99)
GLUCOSE SERPL-MCNC: 217 MG/DL (ref 70–99)
GLUCOSE SERPL-MCNC: 257 MG/DL (ref 70–99)
GLUCOSE SERPL-MCNC: 300 MG/DL (ref 70–99)
GLUCOSE SERPL-MCNC: 339 MG/DL (ref 70–99)
GLUCOSE SERPL-MCNC: 376 MG/DL (ref 70–99)
GLUCOSE SERPL-MCNC: 398 MG/DL (ref 70–99)
HBA1C MFR BLD: 12.4 % (ref 4.2–6.3)
HCT VFR BLD AUTO: 29.5 % (ref 41–53)
HGB BLD-MCNC: 10.2 G/DL (ref 13.5–17.5)
LACTATE SERPL-SCNC: 1.1 MMOL/L (ref 0.7–2)
MCH RBC QN AUTO: 28.6 PG (ref 26–34)
MCHC RBC AUTO-ENTMCNC: 34.4 G/DL (ref 31–36)
MCV RBC AUTO: 83 FL (ref 80–100)
PLATELET # BLD AUTO: 516 THOUSANDS/UL (ref 150–450)
PMV BLD AUTO: 7 FL (ref 8.9–12.7)
POTASSIUM SERPL-SCNC: 3.9 MMOL/L (ref 3.6–5)
POTASSIUM SERPL-SCNC: 4 MMOL/L (ref 3.6–5)
POTASSIUM SERPL-SCNC: 4.4 MMOL/L (ref 3.6–5)
PROT SERPL-MCNC: 5.8 G/DL (ref 5.9–8.4)
RBC # BLD AUTO: 3.56 MILLION/UL (ref 4.5–5.9)
SODIUM SERPL-SCNC: 133 MMOL/L (ref 137–147)
SODIUM SERPL-SCNC: 134 MMOL/L (ref 137–147)
SODIUM SERPL-SCNC: 135 MMOL/L (ref 137–147)
WBC # BLD AUTO: 9.6 THOUSAND/UL (ref 4.5–11)

## 2018-11-23 PROCEDURE — 80048 BASIC METABOLIC PNL TOTAL CA: CPT | Performed by: EMERGENCY MEDICINE

## 2018-11-23 PROCEDURE — 83036 HEMOGLOBIN GLYCOSYLATED A1C: CPT | Performed by: INTERNAL MEDICINE

## 2018-11-23 PROCEDURE — 83605 ASSAY OF LACTIC ACID: CPT | Performed by: INTERNAL MEDICINE

## 2018-11-23 PROCEDURE — 80053 COMPREHEN METABOLIC PANEL: CPT | Performed by: INTERNAL MEDICINE

## 2018-11-23 PROCEDURE — 80048 BASIC METABOLIC PNL TOTAL CA: CPT | Performed by: INTERNAL MEDICINE

## 2018-11-23 PROCEDURE — 85027 COMPLETE CBC AUTOMATED: CPT | Performed by: INTERNAL MEDICINE

## 2018-11-23 PROCEDURE — 82948 REAGENT STRIP/BLOOD GLUCOSE: CPT

## 2018-11-23 RX ORDER — INSULIN GLARGINE 100 [IU]/ML
40 INJECTION, SOLUTION SUBCUTANEOUS ONCE
Status: DISCONTINUED | OUTPATIENT
Start: 2018-11-23 | End: 2018-11-23 | Stop reason: HOSPADM

## 2018-11-23 RX ORDER — INSULIN GLARGINE 100 [IU]/ML
40 INJECTION, SOLUTION SUBCUTANEOUS ONCE
Qty: 10 ML | Refills: 0 | Status: SHIPPED | OUTPATIENT
Start: 2018-11-23 | End: 2018-11-23

## 2018-11-23 RX ORDER — INSULIN GLARGINE 100 [IU]/ML
40 INJECTION, SOLUTION SUBCUTANEOUS ONCE
Status: COMPLETED | OUTPATIENT
Start: 2018-11-23 | End: 2018-11-23

## 2018-11-23 RX ORDER — CIPROFLOXACIN 500 MG/1
500 TABLET, FILM COATED ORAL EVERY 12 HOURS SCHEDULED
Qty: 4 TABLET | Refills: 0 | Status: SHIPPED | OUTPATIENT
Start: 2018-11-23 | End: 2018-11-25

## 2018-11-23 RX ADMIN — INSULIN LISPRO 5 UNITS: 100 INJECTION, SOLUTION INTRAVENOUS; SUBCUTANEOUS at 11:25

## 2018-11-23 RX ADMIN — CIPROFLOXACIN HYDROCHLORIDE 500 MG: 250 TABLET, FILM COATED ORAL at 09:13

## 2018-11-23 RX ADMIN — DEXTROSE AND SODIUM CHLORIDE 250 ML/HR: 5; 900 INJECTION, SOLUTION INTRAVENOUS at 04:37

## 2018-11-23 RX ADMIN — DEXTROSE AND SODIUM CHLORIDE 250 ML/HR: 5; 900 INJECTION, SOLUTION INTRAVENOUS at 08:18

## 2018-11-23 RX ADMIN — INSULIN GLARGINE 40 UNITS: 100 INJECTION, SOLUTION SUBCUTANEOUS at 05:43

## 2018-11-23 NOTE — PLAN OF CARE
DISCHARGE PLANNING     Discharge to home or other facility with appropriate resources Progressing        INFECTION - ADULT     Absence or prevention of progression during hospitalization Progressing     Absence of fever/infection during neutropenic period Progressing        Knowledge Deficit     Patient/family/caregiver demonstrates understanding of disease process, treatment plan, medications, and discharge instructions Progressing        METABOLIC, FLUID AND ELECTROLYTES - ADULT     Fluid balance maintained Progressing     Glucose maintained within target range Progressing

## 2018-11-23 NOTE — NURSING NOTE
Awake most of the night, napping on and off, the only complaint is just being hungry  7 diet sodas, 3 turkey sandwiches, 2 jellos, 4 tony crackers, 2 cups of tea and he had water to drink  Just asked what time is breakfast and he is hoping to have double portions  Stable otherwise, see assessments

## 2018-11-23 NOTE — NURSING NOTE
Midnight BMP done and resulted, GAP 7, hourly fingersticks for the last four hours greater than 300  Dr Roni Tang made aware  Keep insulin drip at 2 units

## 2018-11-23 NOTE — NURSING NOTE
DC instructions reviewed with Pt  Meds and supplies given   Pt  Left ICU ambulatory in NAD accompanied by  staff

## 2018-11-23 NOTE — NURSING NOTE
BMP resulted, gap down to 19  Dr Kathi Cheng made aware, says its ok for him to eat  Continues on 3units insulin

## 2018-11-23 NOTE — NURSING NOTE
BMP at 0400, gap 6, , D5 nss started at 250ml/hr, Dr Amy Perdomo paged  BS at 0500 175  Verbal Order received to give 40 units lantus, stop insulin drip an hour after giving lantus

## 2018-11-23 NOTE — NURSING NOTE
Pt as per shift assessment  Refused 0800 bloodwork  Requesting " extra " food  Seen by Dr Jasbir Martínez  Is for tentative DC to home later today
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
## 2018-11-23 NOTE — UTILIZATION REVIEW
Initial Clinical Review    Admission: Date/Time/Statement: 11/22/18 @ 1704 INPATIENT    Orders Placed This Encounter   Procedures    Inpatient Admission (expected length of stay for this patient is greater than two midnights)     Standing Status:   Standing     Number of Occurrences:   1     Order Specific Question:   Admitting Physician     Answer:   Shahla Chavez [[id number]]     Order Specific Question:   Level of Care     Answer:   Critical Care [15]     Order Specific Question:   Estimated length of stay     Answer:   More than 2 Midnights     Order Specific Question:   Certification     Answer:   I certify that inpatient services are medically necessary for this patient for a duration of greater than two midnights  See H&P and MD Progress Notes for additional information about the patient's course of treatment  ED: Date/Time/Mode of Arrival:   ED Arrival Information     Expected Arrival Acuity Means of Arrival Escorted By Service Admission Type    - 11/22/2018 15:21 Urgent [address]  EMS General Medicine Urgent    Arrival Complaint    high blood sugar        Chief Complaint:   Chief Complaint   Patient presents with    Hyperglycemia - Symptomatic     Patient has not had insulin for 2 days, accucheck for EMS reading HI       History of Illness: This is a 40 y/o male that presents to the ED after 2 5 days without insulin  States he is very thirsty  Feels weak  + polyuria and polydipsia  States he was recently admitted in [place], [place]  Came here for housing reasons  States he feels his glucose is high  No fevers or chills symptoms moderate in severity  No other aggravating or alleviating factors  No chest pain  No cough  No uri symptoms  No radiation of symptoms  DDx: hyperglycemia, DKA      ED Vital Signs:   ED Triage Vitals   Temperature Pulse Respirations Blood Pressure SpO2   11/22/18 1522 11/22/18 1522 11/22/18 1522 11/22/18 1522 11/22/18 1522   (!) 96 2 °F (35 7 °C) 94 (!) 26 150/76 100 % Temp Source Heart Rate Source Patient Position - Orthostatic VS BP Location FiO2 (%)   11/22/18 1522 11/22/18 1522 11/22/18 1522 11/22/18 1522 --   Tympanic Monitor Sitting Left arm       Pain Score       11/22/18 2000       No Pain        Wt Readings from Last 1 Encounters:   11/23/18 66 6 kg (146 lb 13 2 oz)       Vital Signs (abnormal):  WNL    Abnormal Labs/Diagnostic Test Results:     11/22/18 1546     pH, [place] 7 350 - 7 450 7 130     pCO2, [place] 41 0 - 51 0 mm Hg 28 0     pO2, [place]ong >=40 0 mm Hg 35 0     HCO3, [place] 23 - 28 mmol/L 9 3     Base Excess, Truong -2 1 - 2 1 mmol/L -18 5     O2 Content, Truong ml/dL 10 9    O2 HGB, VENOUS >75 0 % 60 9      11/22/18 1546     Sodium 137 - 147 mmol/L 129     Potassium 3 6 - 5 0 mmol/L 5 7     Chloride 97 - 108 mmol/L 92     CO2 22 - 30 mmol/L 8     ANION GAP 5 - 14 mmol/L 29     BUN 5 - 25 mg/dL 32     Creatinine 0 70 - 1 50 mg/dL 0 89    Glucose 70 - 99 mg/dL 748     Calcium 8 4 - 10 2 mg/dL 9 6    AST 17 - 59 U/L 36    ALT 9 - 52 U/L 38    Alkaline Phosphatase 43 - 122 U/L 226     Total Protein 5 9 - 8 4 g/dL 7 9    Albumin 3 0 - 5 2 g/dL 4 1    Total Bilirubin <1 30 mg/dL 0 60    eGFR >60 ml/min/1 73sq m 110          Straw     Clarity, UA Clear, Other Cloudy     Specific [place], UA 1 003 - 1 040 1 015    pH, UA 4 5 - 8 0 5 0    Leukocytes, UA Negative 500 0     Nitrite, UA Negative Negative    Protein, UA Negative mg/dl 100 (2+)     Glucose, UA Negative mg/dl >=1000 (1%)     Ketones, UA Negative mg/dl >=150 (3+)     Bilirubin, UA Negative Negative    Blood, UA Negative 150 0     UROBILINOGEN UA 1 0, Negative mg/dL Negative              ED Treatment:   Medication Administration from 11/22/2018 1520 to 11/22/2018 1947       Date/Time Order Dose Route Action Action by Comments     11/22/2018 1652 sodium chloride 0 9 % bolus 1,000 mL 0 mL Intravenous Stopped Temitope Cobian RN      11/22/2018 1550 sodium chloride 0 9 % bolus 1,000 mL 1,000 mL Intravenous New Bag Temitope Cobian, RN 11/22/2018 1829 levofloxacin (LEVAQUIN) IVPB (premix) 750 mg 0 mg Intravenous Stopped Luis Otero RN      11/22/2018 1702 levofloxacin (LEVAQUIN) IVPB (premix) 750 mg 750 mg Intravenous New Bag Luis Otero RN      11/22/2018 1805 sodium chloride 0 9 % infusion 0 mL/hr Intravenous Stopped Luis Otero RN      11/22/2018 1713 sodium chloride 0 9 % infusion 1,000 mL/hr Intravenous New Bag Luis Otero RN      11/22/2018 1806 sodium chloride 0 9 % infusion 500 mL/hr Intravenous Not Given Luis Otero RN duplicate entry     49/47/1000 1718 insulin regular (HumuLIN R,NovoLIN R) 1 Units/mL in sodium chloride 0 9 % 100 mL infusion 6 Units/hr Intravenous Canceled Entry Luis Otero RN original order canceled and duplicate entry     14/23/2871 1714 insulin regular (HumuLIN R,NovoLIN R) injection 6 Units 6 Units Intravenous Given Luis Otero RN      11/22/2018 1807 sodium chloride 0 9 % infusion 2,000 mL/hr Intravenous Not Given Luis Otero RN duplicate entry, already given     11/22/2018 1806 sodium chloride 0 9 % infusion 500 mL/hr Intravenous [address] Luis Otero RN      11/22/2018 1834 insulin regular (HumuLIN R,NovoLIN R) 1 Units/mL in sodium chloride 0 9 % 100 mL infusion 3 Units/hr Intravenous Rate/Dose Change Luis Otero RN      11/22/2018 1720 insulin regular (HumuLIN R,NovoLIN R) 1 Units/mL in sodium chloride 0 9 % 100 mL infusion 6 Units/hr Intravenous New Bag Luis Otero RN verified with 2nd RN          Past Medical/Surgical History:    Active Ambulatory Problems     Diagnosis Date Noted    Hypoglycemia 11/22/2018     Resolved Ambulatory Problems     Diagnosis Date Noted    No Resolved Ambulatory Problems     Past Medical History:   Diagnosis Date    Diabetes mellitus ([facility] [address] )     Hepatitis C     Orthostatic hypotension        Admitting Diagnosis: DKA (diabetic ketoacidoses) ([facility] [address] ) [E13 10]  UTI (urinary tract infection) [N39 0]  History of hyperglycemia [Z86 39]    Age/Sex: 41 y o  male    Assessment/Plan: Rosendo Watts is a 41 y o  male who was sent here due to elevated blood glucose levels  Patient reports unfortunately due to homelessness he has not been able to take his medications as previously ordered, endorses being 2 5 days without insulin  States he is very thirsty  Feels weak  + polyuria and polydipsia  States he was recently admitted in [place], [address] [facility]  Floor here for housing reasons  States he feels his glucose is high  No fevers or chills symptoms moderate in severity  No other aggravating or alleviating factors  No chest pain  No cough  No uri symptoms  No radiation of symptoms  He was found to have DKA and will be admitted for further evaluation and treatment  He has no present complaints         Admission Orders:     #1diabetic ketoacidosis  Initiate DKA protocol  IV fluids with normal saline per DKA protocol  Electrolyte replacement per DKA protocol   Admit to ICU/every 4 BMPs  Begin no concentrated carbohydrate diet  Once anion gap closed is following  Cover with Aspart SSI as needed   Will order HgbA1C to assess status of recent glycemic control  Well initiate home medications once med rec is completed and confirmed by pharmacy      #2 UTI  Levofloxacin initiated in the emergency department  We will switch to by mouth or IV Cipro tomorrow     #3 hyponatremia  Likely due to dehydration from DKA  Aggressive IV resuscitation with normal saline     #4Tobacco dependance  SMOKING history/NICOTINE ADDICTION:  20 pack year history  [facility] is a non-smoking campus and patient likely to have cravings  Will initiate therapy with transdermal Nicotine patch 14 mg transdermal daily  Extensive consultation in regards to benefits of cessation have been given, offered help the sensation      #5 hyperkalemia?   This is likely due to hemolysis  We will repeat BMP again and address accordingly    Admission Orders: Diet Darek/CHO controlled, blood cultures, BMP  In the a m , urine culture, continuous cardiac monitoring, sequential compression device, initiate DKA Electrolyte Lab Replacement, no strenuous exercise         Scheduled Meds:   Current Facility-Administered Medications:  acetaminophen 650 mg Oral Q6H PRN   ciprofloxacin 500 mg Oral Q12H HOLLIE   enoxaparin 40 mg Subcutaneous Daily   insulin glargine 40 Units Subcutaneous Once   insulin lispro 1-5 Units Subcutaneous TID AC   nicotine 1 patch Transdermal Daily   sodium chloride (PF) 3 mL Intravenous PRN     Continuous infusion:     insulin regular (HumuLIN R,NovoLIN R) 1 Units/mL in sodium chloride 0 9 % 100 mL infusion  Rate: 0 1-30 mL/hr Dose: 0 1-30 Units/hr  Freq: Continuous Route: IV      dextrose 5 % and sodium chloride 0 9 % infusion  Rate: 250 mL/hr Dose: 250 mL/hr  Freq: Continuous Route: IV

## 2018-11-23 NOTE — NURSING NOTE
Received patient at [facility] from EDU to room 411, introduced to room, call bell and TV monitor given and their use explained  Patient agitated and upset, says he has only gotten the "run around in the ED" and he wants to talk to the Doctor before 2000 or he is leaving  Says he was counting down the minutes and the Doctor has 12 minutes to get to his room to see him  His main issue is that he wants food, says he need to be fed and that he eats "4000 - 5000" calories per day  He is presently on the insulin drip at 3units and his blood sugar on arrival to unit was 390  Paged placed to the Doctor x 2  It's after 2000 and he says he was leaving, I offered him a cup of tea, he had that and he was willing to wait a few more minutes longer  He then requested a jello and the same given  Dr  returned the call, updated on patient, BMP done and sent to lab, awaiting result on the gap  Patient continues to accept care, but is very adamant about eating and that he must eat and he doesn't care that he is on the insulin drip and the fact that he vomited in the ER after drinking two cups of water

## 2018-11-24 LAB — BACTERIA UR CULT: ABNORMAL

## 2018-11-24 NOTE — PHYSICIAN ADVISOR
Current patient class: Inpatient  The patient is currently on Hospital Day: 2 at [address]      The patient was admitted to the hospital at [phone] on 11/22/18 for the following diagnosis:  DKA (diabetic ketoacidoses) ([address] ) [E13 10]  UTI (urinary tract infection) [N39 0]  History of hyperglycemia [Z86 39]       There is documentation in the medical record of an expected length of stay of at least 2 midnights  The patient is therefore expected to satisfy the 2 midnight benchmark and given the 2 midnight presumption is appropriate for INPATIENT ADMISSION  Given this expectation of a satisfying stay, CMS instructs us that the patient is most often appropriate for inpatient admission under part A provided medical necessity is documented in the chart  After review of the relevant documentation, labs, vital signs and test results, the patient is appropriate for INPATIENT ADMISSION  Admission to the hospital as an inpatient is a complex decision making process which requires the practitioner to consider the patients presenting complaint, history and physical examination and all relevant testing  With this in mind, in this case, the patient was deemed appropriate for INPATIENT ADMISSION  After review of the documentation and testing available at the time of the admission I concur with this clinical determination of medical necessity  Rationale is as follows: The patient is a 41 yrs old Male who presented to the ED at 11/22/2018  3:21 PM with a chief complaint of Hyperglycemia - Symptomatic (Patient has not had insulin for 2 days, accucheck for EMS reading HI)     Given the need for further hospitalization, and along with the documentation of medical necessity present in the chart, the patient is appropriate for inpatient admission  The patient is expected to satisfy the 2 midnight benchmark, and will require further acute medical care   The patient does have comorbid conditions which increases the risk for significant adverse outcome  Given this the patient is appropriate for inpatient admission  The patients vitals on arrival were ED Triage Vitals   Temperature Pulse Respirations Blood Pressure SpO2   11/22/18 1522 11/22/18 1522 11/22/18 1522 11/22/18 1522 11/22/18 1522   (!) 96 2 °F (35 7 °C) 94 (!) 26 150/76 100 %      Temp Source Heart Rate Source Patient Position - Orthostatic VS BP Location FiO2 (%)   11/22/18 1522 11/22/18 1522 11/22/18 1522 11/22/18 1522 --   Tympanic Monitor Sitting Left arm       Pain Score       11/22/18 2000       No Pain           Past Medical History:   Diagnosis Date    Diabetes mellitus ([facility] [address] )     Hepatitis C     Orthostatic hypotension      Past Surgical History:   Procedure Laterality Date    CYST REMOVAL      FRACTURE SURGERY             Consults have been placed to:   None    Vitals:    11/23/18 0700 11/23/18 0800 11/23/18 0900 11/23/18 1000   BP: 133/88 131/75 121/70 118/73   BP Location: Right arm Right arm  Right arm   Pulse: 84 86 95 93   Resp: 14 16 17 13   Temp:  98 3 °F (36 8 °C)     TempSrc:  Temporal     SpO2: 98% 97% 97% 99%   Weight:       Height:           Most recent labs:    Recent Labs      11/23/18   0630   WBC  9 60   HGB  10 2*   HCT  29 5*   PLT  516*   K  3 9   CALCIUM  7 9*   BUN  21   CREATININE  0 72   AST  22   ALT  33   ALKPHOS  136*       Scheduled Meds:  Continuous Infusions:  No current facility-administered medications for this encounter  PRN Meds:      Surgical procedures (if appropriate):

## 2018-11-26 LAB
ATRIAL RATE: 99 BPM
P AXIS: 59 DEGREES
PR INTERVAL: 152 MS
QRS AXIS: 6 DEGREES
QRSD INTERVAL: 88 MS
QT INTERVAL: 376 MS
QTC INTERVAL: 482 MS
T WAVE AXIS: 44 DEGREES
VENTRICULAR RATE: 99 BPM

## 2018-11-26 PROCEDURE — 93010 ELECTROCARDIOGRAM REPORT: CPT | Performed by: INTERNAL MEDICINE

## 2018-11-27 LAB
BACTERIA BLD CULT: NORMAL
BACTERIA BLD CULT: NORMAL